# Patient Record
Sex: FEMALE | Race: WHITE | NOT HISPANIC OR LATINO | URBAN - METROPOLITAN AREA
[De-identification: names, ages, dates, MRNs, and addresses within clinical notes are randomized per-mention and may not be internally consistent; named-entity substitution may affect disease eponyms.]

---

## 2017-01-03 ENCOUNTER — INITIAL CONSULT (OUTPATIENT)
Dept: OPHTHALMOLOGY | Facility: CLINIC | Age: 67
End: 2017-01-03
Payer: MEDICARE

## 2017-01-03 DIAGNOSIS — H50.00 ESOTROPIA, UNSPECIFIED: Primary | ICD-10-CM

## 2017-01-03 PROCEDURE — 92060 SENSORIMOTOR EXAMINATION: CPT | Mod: S$GLB,,, | Performed by: OPHTHALMOLOGY

## 2017-01-03 PROCEDURE — 99999 PR PBB SHADOW E&M-NEW PATIENT-LVL II: CPT | Mod: PBBFAC,,, | Performed by: OPHTHALMOLOGY

## 2017-01-03 PROCEDURE — 92002 INTRM OPH EXAM NEW PATIENT: CPT | Mod: S$GLB,,, | Performed by: OPHTHALMOLOGY

## 2017-01-03 RX ORDER — AMLODIPINE AND BENAZEPRIL HYDROCHLORIDE 5; 20 MG/1; MG/1
1 CAPSULE ORAL NIGHTLY
COMMUNITY
Start: 2016-09-30

## 2017-01-03 RX ORDER — PROMETHAZINE HYDROCHLORIDE 25 MG/1
25 TABLET ORAL EVERY 6 HOURS PRN
COMMUNITY

## 2017-01-03 RX ORDER — TRAMADOL HYDROCHLORIDE 50 MG/1
2 TABLET ORAL DAILY PRN
COMMUNITY
Start: 2016-12-15

## 2017-01-03 RX ORDER — METOPROLOL SUCCINATE 50 MG/1
1 TABLET, EXTENDED RELEASE ORAL DAILY
COMMUNITY
Start: 2016-11-25

## 2017-01-03 RX ORDER — TIZANIDINE 4 MG/1
2 TABLET ORAL CONTINUOUS PRN
COMMUNITY
Start: 2016-11-05

## 2017-01-03 NOTE — PROGRESS NOTES
HPI     66 yr old referred by Dr. Cowart for evaluation of side by side   diplopia, Onset 6 months ago.  Has a history of muscular dystrophy.  Pt   states that she will close one eye (the left eye is the preferred eye to   close) when needed to control the diplopia.   PT states that diplopia is   worse at night when tired.  No eye pain.  Left lid looks droopy.  Had MRI   that came back WNL.        Last edited by MARLIN White Jr., MD on 1/3/2017 10:57 AM.     ROS     Positive for: Neurological, Eyes    Last edited by MARLIN White Jr., MD on 1/3/2017 10:57 AM. (History)        Assessment /Plan     For exam results, see Encounter Report.    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

## 2017-01-03 NOTE — LETTER
January 3, 2017      Ananay Terrazas MD  [address]  [place] [address]           [facility]  [address]  [place] [address]  Phone: [phone]  Fax: [phone]          Patient: Carmelita Villagomez   MR Number: [id number]   YOB: 1950   Date of Visit: 1/3/2017       Dear Dr. Ananya Terrazas:    Thank you for referring Carmelita Villagomez to me for evaluation. Attached you will find relevant portions of my assessment and plan of care.    If you have questions, please do not hesitate to call me. I look forward to following Carmelita Villagomez along with you.    Sincerely,    MARLIN White Jr., MD    Enclosure  CC:  No Recipients    If you would like to receive this communication electronically, please contact [email or web address] or [phone] to request more information on [employer] access.    For providers and/or their staff who would like to refer a patient to [facility], please contact us through our one-stop-shop provider referral line, [facility], at [phone].    If you feel you have received this communication in error or would no longer like to receive these types of communications, please e-mail [email or web address]

## 2017-01-03 NOTE — PROGRESS NOTES
HPI     66 yr old referred by Dr. Cowart for evaluation of side by side   diplopia, Onset 6 months ago.  Has a history of muscular dystrophy.  Pt   states that she will close one eye (the left eye is the preferred eye to   close) when needed to control the diplopia.   PT states that diplopia is   worse at night when tired.  No eye pain.  Left lid looks droopy.  Had MRI   that came back WNL.        Last edited by MARLIN White Jr., MD on 1/3/2017 10:57 AM.     ROS     Positive for: Neurological, Eyes    Last edited by MARLIN White Jr., MD on 1/3/2017 10:57 AM. (History)        Assessment /Plan     For exam results, see Encounter Report.    Esotropia, unspecified    Fresnel Prism to control diplopia VS surgical correction  Consider surgical correction to correct esotropia. The details of the surgical procedure were discussed. The risks of the procedure were identified and explained. Treatment alternatives were listed.    Procedure will be to loosen the inner muscles of each eye.  95% success rate with 5% chance need to repeat surgery, use of adjustable suture explained to ensure a better outcome.

## 2018-05-01 DIAGNOSIS — M47.896 OTHER OSTEOARTHRITIS OF SPINE, LUMBAR REGION: ICD-10-CM

## 2018-05-01 DIAGNOSIS — M54.16 LUMBAR RADICULOPATHY: Primary | ICD-10-CM

## 2023-02-23 ENCOUNTER — TELEPHONE (OUTPATIENT)
Dept: NEUROLOGY | Facility: CLINIC | Age: 73
End: 2023-02-23

## 2023-02-24 NOTE — TELEPHONE ENCOUNTER
----- Message from Katie Darnell sent at 2/23/2023 11:32 AM CST -----  Contact: Carmelita @[phone]  Pt stated she was offered an appt to be seen soon as NP but was unable to go due to lack of transportation but she needs now because she has some meds that needs to be filled.

## 2023-03-08 ENCOUNTER — TELEPHONE (OUTPATIENT)
Dept: NEUROLOGY | Facility: CLINIC | Age: 73
End: 2023-03-08

## 2023-03-08 NOTE — TELEPHONE ENCOUNTER
----- Message from Lee Ann Mccloud sent at 3/8/2023  1:19 PM CST -----  Regarding: Appt  Contact: Pt @ [phone]  Pt is calling to get urgent appt. Pt need a refill on Gabapentin. Asking for a call back